# Patient Record
Sex: FEMALE | Race: WHITE
[De-identification: names, ages, dates, MRNs, and addresses within clinical notes are randomized per-mention and may not be internally consistent; named-entity substitution may affect disease eponyms.]

---

## 2020-05-22 ENCOUNTER — HOSPITAL ENCOUNTER (EMERGENCY)
Dept: HOSPITAL 62 - ER | Age: 30
Discharge: HOME | End: 2020-05-22
Payer: MEDICAID

## 2020-05-22 VITALS — SYSTOLIC BLOOD PRESSURE: 117 MMHG | DIASTOLIC BLOOD PRESSURE: 78 MMHG

## 2020-05-22 DIAGNOSIS — R53.81: ICD-10-CM

## 2020-05-22 DIAGNOSIS — R53.1: ICD-10-CM

## 2020-05-22 DIAGNOSIS — R11.2: ICD-10-CM

## 2020-05-22 DIAGNOSIS — F17.210: ICD-10-CM

## 2020-05-22 DIAGNOSIS — F32.9: ICD-10-CM

## 2020-05-22 DIAGNOSIS — F10.920: Primary | ICD-10-CM

## 2020-05-22 DIAGNOSIS — Z59.0: ICD-10-CM

## 2020-05-22 LAB
ADD MANUAL DIFF: NO
ALBUMIN SERPL-MCNC: 4.2 G/DL (ref 3.5–5)
ALP SERPL-CCNC: 102 U/L (ref 38–126)
ANION GAP SERPL CALC-SCNC: 9 MMOL/L (ref 5–19)
APAP SERPL-MCNC: < 10 UG/ML (ref 10–30)
APPEARANCE UR: CLEAR
APTT PPP: (no result) S
AST SERPL-CCNC: 34 U/L (ref 14–36)
BARBITURATES UR QL SCN: NEGATIVE
BASOPHILS # BLD AUTO: 0 10^3/UL (ref 0–0.2)
BASOPHILS NFR BLD AUTO: 0.5 % (ref 0–2)
BILIRUB DIRECT SERPL-MCNC: 0 MG/DL (ref 0–0.4)
BILIRUB SERPL-MCNC: 0.5 MG/DL (ref 0.2–1.3)
BILIRUB UR QL STRIP: NEGATIVE
BUN SERPL-MCNC: 12 MG/DL (ref 7–20)
CALCIUM: 8.5 MG/DL (ref 8.4–10.2)
CHLORIDE SERPL-SCNC: 103 MMOL/L (ref 98–107)
CK SERPL-CCNC: 80 U/L (ref 30–135)
CO2 SERPL-SCNC: 29 MMOL/L (ref 22–30)
EOSINOPHIL # BLD AUTO: 0.1 10^3/UL (ref 0–0.6)
EOSINOPHIL NFR BLD AUTO: 2 % (ref 0–6)
ERYTHROCYTE [DISTWIDTH] IN BLOOD BY AUTOMATED COUNT: 15 % (ref 11.5–14)
ETHANOL SERPL-MCNC: 451 MG/DL
GLUCOSE SERPL-MCNC: 118 MG/DL (ref 75–110)
GLUCOSE UR STRIP-MCNC: NEGATIVE MG/DL
HCT VFR BLD CALC: 41.9 % (ref 36–47)
HGB BLD-MCNC: 14 G/DL (ref 12–15.5)
KETONES UR STRIP-MCNC: NEGATIVE MG/DL
LYMPHOCYTES # BLD AUTO: 2.3 10^3/UL (ref 0.5–4.7)
LYMPHOCYTES NFR BLD AUTO: 41 % (ref 13–45)
MCH RBC QN AUTO: 29.6 PG (ref 27–33.4)
MCHC RBC AUTO-ENTMCNC: 33.5 G/DL (ref 32–36)
MCV RBC AUTO: 88 FL (ref 80–97)
METHADONE UR QL SCN: NEGATIVE
MONOCYTES # BLD AUTO: 0.3 10^3/UL (ref 0.1–1.4)
MONOCYTES NFR BLD AUTO: 5 % (ref 3–13)
NEUTROPHILS # BLD AUTO: 2.9 10^3/UL (ref 1.7–8.2)
NEUTS SEG NFR BLD AUTO: 51.5 % (ref 42–78)
NITRITE UR QL STRIP: NEGATIVE
PCP UR QL SCN: NEGATIVE
PH UR STRIP: 7 [PH] (ref 5–9)
PLATELET # BLD: 285 10^3/UL (ref 150–450)
POTASSIUM SERPL-SCNC: 4.6 MMOL/L (ref 3.6–5)
PROT SERPL-MCNC: 7.8 G/DL (ref 6.3–8.2)
PROT UR STRIP-MCNC: NEGATIVE MG/DL
RBC # BLD AUTO: 4.74 10^6/UL (ref 3.72–5.28)
SALICYLATES SERPL-MCNC: < 1 MG/DL (ref 2–20)
SP GR UR STRIP: 1
TOTAL CELLS COUNTED % (AUTO): 100 %
URINE AMPHETAMINES SCREEN: NEGATIVE
URINE BENZODIAZEPINES SCREEN: NEGATIVE
URINE COCAINE SCREEN: NEGATIVE
URINE MARIJUANA (THC) SCREEN: NEGATIVE
UROBILINOGEN UR-MCNC: NEGATIVE MG/DL (ref ?–2)
WBC # BLD AUTO: 5.7 10^3/UL (ref 4–10.5)

## 2020-05-22 PROCEDURE — 82550 ASSAY OF CK (CPK): CPT

## 2020-05-22 PROCEDURE — 81001 URINALYSIS AUTO W/SCOPE: CPT

## 2020-05-22 PROCEDURE — 80307 DRUG TEST PRSMV CHEM ANLYZR: CPT

## 2020-05-22 PROCEDURE — S0028 INJECTION, FAMOTIDINE, 20 MG: HCPCS

## 2020-05-22 PROCEDURE — 80053 COMPREHEN METABOLIC PANEL: CPT

## 2020-05-22 PROCEDURE — 36415 COLL VENOUS BLD VENIPUNCTURE: CPT

## 2020-05-22 PROCEDURE — 85025 COMPLETE CBC W/AUTO DIFF WBC: CPT

## 2020-05-22 PROCEDURE — 96375 TX/PRO/DX INJ NEW DRUG ADDON: CPT

## 2020-05-22 PROCEDURE — 99285 EMERGENCY DEPT VISIT HI MDM: CPT

## 2020-05-22 PROCEDURE — 96365 THER/PROPH/DIAG IV INF INIT: CPT

## 2020-05-22 PROCEDURE — 96361 HYDRATE IV INFUSION ADD-ON: CPT

## 2020-05-22 SDOH — ECONOMIC STABILITY - HOUSING INSECURITY: HOMELESSNESS: Z59.0

## 2020-05-22 NOTE — ER DOCUMENT REPORT
Doctor's Note


Notes: 





05/22/20 14:13


Patient is resting comfortably.  Her blood alcohol level was 451mg% at 01:10 

this morning.  She was sent here from [facility] but because her alcohol level needs to

be below 200 to return.  Alcohol level will be redrawn to confirm it is below 

200 as it would most expectedly be at this point.

## 2020-05-22 NOTE — ER DOCUMENT REPORT
ED General





- General


Chief Complaint: Vomiting


Stated Complaint: VOMITING


Time Seen by Provider: 20 00:47


Mode of Arrival: Ambulatory


Information source: Patient


Cannot obtain history due to: Intoxicated


Notes: 





29-year-old  female arrives by POV with chief complaint of being 

homeless and drinking a lot of alcohol which includes beer and "Four Nacho" which

is a wine like beverage sold by [employer] with alcohol content of @ 14% ; 

patient reports her boyfriend  in [facility] 3 days ago from heroin 

overdose.  Patient is originally from [place] and tried to check herself in at 

[facility].  Unfortunately her alcohol level was 0.43 and she was sent here in order to

obtain a more lower alcohol level.  Patient appears to be able to ambulate and 

is quite oriented despite her high alcohol level.  She does have some nausea at 

this time as she was wheelchair again by BERNARDO Dye.


TRAVEL OUTSIDE OF THE U[place] IN LAST 30 DAYS: No





- HPI


Onset: Just prior to arrival


Onset/Duration: Sudden, Persistent


Quality of pain: No pain


Severity: None


Pain Level: Denies


Associated symptoms: None


Exacerbated by: Walking


Relieved by: Sitting


Similar symptoms previously: Yes


Recently seen / treated by doctor: No





- Related Data


Allergies/Adverse Reactions: 


                                        





No Known Allergies Allergy (Unverified 20 00:54)


   











Past Medical History





- General


Information source: Patient





- Social History


Smoking Status: Current Every Day Smoker


Cigarette use (# per day): Yes


Chew tobacco use (# tins/day): No


Smoking Education Provided: Yes


Frequency of alcohol use: Heavy


Drug Abuse: Other


Lives with: Family


Family History: Reviewed & Not Pertinent


Patient has suicidal ideation: No


Patient has homicidal ideation: No





Review of Systems





- Review of Systems


Constitutional: See HPI, Malaise, Weakness


EENT: No symptoms reported


Cardiovascular: No symptoms reported


Respiratory: No symptoms reported


Gastrointestinal: No symptoms reported


Genitourinary: No symptoms reported


Female Genitourinary: No symptoms reported


Musculoskeletal: No symptoms reported


Skin: No symptoms reported


Hematologic/Lymphatic: No symptoms reported


Neurological/Psychological: See HPI, Weakness, Other - Intoxicated on alcohol





Physical Exam





- Vital signs


Vitals: 


                                        











Temp Pulse Resp BP Pulse Ox


 


 98.4 F   104 H  16   142/98 H  98 


 


 20 00:39  20 00:39  20 00:39  20 00:39  20 00:39











Interpretation: Hypertensive, Tachycardic





- General


General appearance: Alert





- HEENT


Head: Normocephalic, Atraumatic


Eyes: Normal


Pupils: PERRL





- Respiratory


Respiratory status: No respiratory distress


Chest status: Nontender


Breath sounds: Normal


Chest palpation: Normal





- Cardiovascular


Rhythm: Tachycardia


Heart sounds: Normal auscultation


Murmur: No





- Abdominal


Inspection: Normal


Distension: No distension


Bowel sounds: Normal


Tenderness: Nontender


Organomegaly: No organomegaly





- Back


Back: Normal





- Extremities


General upper extremity: Normal inspection


General lower extremity: Normal inspection





- Neurological


Neuro grossly intact: Yes


Cognition: Normal


Orientation: AAOx4


Kimberly Coma Scale Eye Opening: Spontaneous


Kimberly Coma Scale Verbal: Oriented


Kim[place]ly Coma Scale Motor: Obeys Commands


[place] Coma Scale Total: 15


Speech: Normal


Motor strength normal: LUE, RUE, LLE, RLE


Sensory: Normal





- Psychological


Associated symptoms: Tearful





- Skin


Skin Temperature: Warm


Skin Moisture: Dry





Course





- Vital Signs


Vital signs: 


                                        











Temp Pulse Resp BP Pulse Ox


 


 98.0 F   93   22 H  107/74   98 


 


 20 00:55  20 01:10  20 03:01  20 04:00  20 04:01














- Laboratory


Result Diagrams: 


                                 20 01:10





                                 20 01:10


Laboratory results interpreted by me: 


                                        











  20





  01:10 01:10 01:10


 


RDW  15.0 H  


 


Glucose   118 H 


 


Ur Leukocyte Esterase    SMALL H


 


Salicylates   < 1.0 L 


 


Acetaminophen   < 10 L 


 


Serum Alcohol   451 H* 














Critical Care Note





- Critical Care Note


Total time excluding time spent on procedures (mins): 90


Comments: 





Plan to discharge to [facility] as soon as alcohol level has dropped beneath 200 mg/dL 

alcohol level





Discharge





- Discharge


Clinical Impression: 


 Depression, reactive





Alcohol intoxication


Qualifiers:


 Complication of substance-induced condition: uncomplicated Qualified Code(s): 

F10.920 - Alcohol use, unspecified with intoxication, uncomplicated





Condition: Good


Disposition: OTHER


Additional Instructions: 


As soon as you leave here proceed to [facility]; return to ER if symptoms persist

or other severe problems arise.
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
## 2020-05-28 ENCOUNTER — HOSPITAL ENCOUNTER (EMERGENCY)
Dept: HOSPITAL 62 - ER | Age: 30
Discharge: HOME | End: 2020-05-28
Payer: COMMERCIAL

## 2020-05-28 VITALS — DIASTOLIC BLOOD PRESSURE: 90 MMHG | SYSTOLIC BLOOD PRESSURE: 138 MMHG

## 2020-05-28 VITALS — SYSTOLIC BLOOD PRESSURE: 146 MMHG | DIASTOLIC BLOOD PRESSURE: 89 MMHG

## 2020-05-28 DIAGNOSIS — H60.92: ICD-10-CM

## 2020-05-28 DIAGNOSIS — H92.02: Primary | ICD-10-CM

## 2020-05-28 DIAGNOSIS — M54.9: ICD-10-CM

## 2020-05-28 DIAGNOSIS — R10.9: ICD-10-CM

## 2020-05-28 DIAGNOSIS — F12.10: ICD-10-CM

## 2020-05-28 DIAGNOSIS — H60.502: Primary | ICD-10-CM

## 2020-05-28 DIAGNOSIS — Z20.828: ICD-10-CM

## 2020-05-28 DIAGNOSIS — R13.10: ICD-10-CM

## 2020-05-28 DIAGNOSIS — R07.0: ICD-10-CM

## 2020-05-28 DIAGNOSIS — F17.200: ICD-10-CM

## 2020-05-28 DIAGNOSIS — R11.10: ICD-10-CM

## 2020-05-28 DIAGNOSIS — H92.02: ICD-10-CM

## 2020-05-28 DIAGNOSIS — H66.92: ICD-10-CM

## 2020-05-28 DIAGNOSIS — N39.0: ICD-10-CM

## 2020-05-28 LAB
APPEARANCE UR: (no result)
APTT PPP: (no result) S
BILIRUB UR QL STRIP: (no result)
GLUCOSE UR STRIP-MCNC: NEGATIVE MG/DL
KETONES UR STRIP-MCNC: 20 MG/DL
NITRITE UR QL STRIP: NEGATIVE
PH UR STRIP: 6 [PH] (ref 5–9)
PROT UR STRIP-MCNC: 100 MG/DL
SP GR UR STRIP: 1.03
UROBILINOGEN UR-MCNC: 4 MG/DL (ref ?–2)

## 2020-05-28 PROCEDURE — 87635 SARS-COV-2 COVID-19 AMP PRB: CPT

## 2020-05-28 PROCEDURE — 87086 URINE CULTURE/COLONY COUNT: CPT

## 2020-05-28 PROCEDURE — 99282 EMERGENCY DEPT VISIT SF MDM: CPT

## 2020-05-28 PROCEDURE — 99283 EMERGENCY DEPT VISIT LOW MDM: CPT

## 2020-05-28 PROCEDURE — 81001 URINALYSIS AUTO W/SCOPE: CPT

## 2020-05-28 PROCEDURE — C9803 HOPD COVID-19 SPEC COLLECT: HCPCS

## 2020-05-28 PROCEDURE — 96365 THER/PROPH/DIAG IV INF INIT: CPT

## 2020-05-28 PROCEDURE — 81025 URINE PREGNANCY TEST: CPT

## 2020-05-28 RX ADMIN — HYDROCODONE BITARTRATE AND ACETAMINOPHEN PRN BOTTLE: 5; 325 TABLET ORAL at 23:59

## 2020-05-28 RX ADMIN — HYDROCODONE BITARTRATE AND ACETAMINOPHEN PRN BOTTLE: 5; 325 TABLET ORAL at 22:56
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
## 2020-05-28 NOTE — ER DOCUMENT REPORT
ED General





- General


Chief Complaint: Vomiting


Stated Complaint: VOMITING/EAR PAIN/BACK PAIN


Time Seen by Provider: 05/28/20 21:48


Notes: 


Patient is a 29-year-old female that comes emergency department for chief 

complaint of 3 days of progressively worsening left ear pain.  She also reports 

some throat irritation and pain on the left side when she swallows.  Patient 

states she was seen here earlier, given eardrops, she states these are not h

elping and her pain is getting worse.  She also states that she thinks she might

be pregnant and this was not tested when she was seen her earlier as well.  She 

denies fever, shortness of breath, chest pain, headache, abdominal pain, vaginal

bleeding or discharge.  She denies any daily medications.  She denies IV drug 

abuse, states she smokes occasional marijuana.  She does report smoking and alco

hol.  Patient also noted to be homeless here previously.


TRAVEL OUTSIDE OF THE [place] IN LAST 30 DAYS: No





- Related Data


Allergies/Adverse Reactions: 


                                        





No Known Allergies Allergy (Verified 05/28/20 21:40)


   











Past Medical History





- General


Information source: Patient





- Social History


Smoking Status: Current Every Day Smoker


Frequency of alcohol use: Social


Drug Abuse: Marijuana


Lives with: Alone


Family History: Reviewed & Not Pertinent





- Medical History


Medical History: Negative


Surgical Hx: Negative





- Immunizations


Immunizations up to date: Yes


Hx Diphtheria, Pertussis, Tetanus Vaccination: Yes





Review of Systems





- Review of Systems


Constitutional: No symptoms reported


EENT: See HPI


Cardiovascular: No symptoms reported


Respiratory: No symptoms reported


Gastrointestinal: No symptoms reported


Genitourinary: No symptoms reported


Female Genitourinary: See HPI


Musculoskeletal: No symptoms reported


Skin: No symptoms reported


Hematologic/Lymphatic: No symptoms reported


Neurological/Psychological: No symptoms reported





Physical Exam





- Vital signs


Vitals: 


                                        











Temp Resp BP


 


 98.3 F   18   146/89 H


 


 05/28/20 21:18  05/28/20 21:18  05/28/20 21:18














- Notes


Notes: 





GENERAL: Alert, interacts well. No acute distress.


HEAD: Normocephalic, atraumatic.


EYES: Pupils equal, round, and reactive to light. Extraocular movements intact.


ENT: Oral mucosa moist, tongue midline. Oropharynx unremarkable. Airway patent. 

Nares patent, sinuses non-tender. right ear unremarkable and nontender.  Left 

ear with obvious otitis externa with swelling of the ear canal and small amount 

of drainage.  There also appears to be some erythema of the tympanic membrane.  

Tenderness over the tragus but normal mastoid.  Otherwise unremarkable.


NECK: Full range of motion. Supple. Trachea midline. No lymphadenopathy.  No 

nuchal rigidity.


LUNGS: Clear to auscultation bilaterally, no wheezes, rales, or rhonchi. No 

respiratory distress. Non-tender chest wall. 


HEART: Borderline tachycardia, normal rhythm, no murmur


ABDOMEN: Soft, non-tender. Non-distended. Bowel sounds present in all 4 

quadrants.


GENITOURINARY: Deferred


EXTREMITIES: Moves all 4 extremities spontaneously. No edema, normal radial and 

dorsalis pedis pulses bilaterally. No cyanosis.


BACK: no cervical, thoracic, lumbar midline tenderness. No saddle anesthesia, 

normal distal neurovascular exam. Moves all extremities in full range of motion.


NEUROLOGICAL: Alert and oriented x3. Normal speech. Cranial nerves II through 

XII grossly intact. Strength 5/5 in all extremities. 


PSYCH: Normal affect, normal mood.


SKIN: Warm, dry, normal turgor. No rashes or lesions noted.





Course





- Re-evaluation


Re-evalutation: 


Patient does have obvious otitis externa with swelling of the ear canal and 

probable borderline otitis media as well.  Mastoid is normal.  Patient is 

afebrile and she is actually quite well-appearing other than dry mucous 

membranes and tachycardia.  This resolved with IV fluids.  Patient has a urinary

tract infection but her pregnancy test is negative.  No CVA tenderness on my 

exam, soft benign abdomen, no vomiting, no fever.  Patient was given a dose of 

Rocephin along with the IV fluids here.  Despite homelessness patient does state

that she will fill and take the antibiotics prescribed and she was provided with

Ciprodex from here.  Patient has no other complaints, states appreciation.  

Stable and well-appearing at time of discharge.





- Vital Signs


Vital signs: 


                                        











Temp Pulse Resp BP Pulse Ox


 


 98.3 F   100   18   146/89 H  100 


 


 05/28/20 21:40  05/28/20 22:00  05/28/20 22:00  05/28/20 21:18  05/28/20 22:00














- Laboratory


Laboratory results interpreted by me: 


                                        











  05/28/20





  21:35


 


Urine Protein  100 H


 


Urine Ketones  20 H


 


Urine Blood  SMALL H


 


Urine Bilirubin  SMALL H


 


Urine Urobilinogen  4.0 H


 


Ur Leukocyte Esterase  SMALL H














Discharge





- Discharge


Clinical Impression: 


 Flank pain





Otitis externa


Qualifiers:


 Otitis externa type: unspecified type Chronicity: acute Laterality: left 

Qualified Code(s): H60.502 - Unspecified acute noninfective otitis externa, left

ear





Urinary tract infection


Qualifiers:


 Urinary tract infection type: site unspecified Hematuria presence: without 

hematuria Qualified Code(s): N39.0 - Urinary tract infection, site not specified





Condition: Stable


Disposition: HOME, SELF-CARE


Additional Instructions: 


Your work-up shows dehydration, urinary tract infection, and the ear infection 

as discussed.  Take the antibiotics as prescribed to completion, fill and take 

the ones prescribed now instead of the previous ones.  Also place the eardrops 

in your ear, 4 drops, twice a day, for 7 days.  Follow-up with primary care.





Come back if you worsen including vomiting, spiking fever, swelling or redness 

at the outside of the ear, or any other concerning symptoms.


Prescriptions: 


Cephalexin Monohydrate [Keflex 500 mg Capsule] 500 mg PO QID #28 capsule

## 2020-05-29 ENCOUNTER — HOSPITAL ENCOUNTER (EMERGENCY)
Dept: HOSPITAL 62 - ER | Age: 30
Discharge: HOME | End: 2020-05-29
Payer: COMMERCIAL

## 2020-05-29 VITALS — DIASTOLIC BLOOD PRESSURE: 89 MMHG | SYSTOLIC BLOOD PRESSURE: 138 MMHG

## 2020-05-29 DIAGNOSIS — R74.8: ICD-10-CM

## 2020-05-29 DIAGNOSIS — Z59.0: ICD-10-CM

## 2020-05-29 DIAGNOSIS — Z20.828: ICD-10-CM

## 2020-05-29 DIAGNOSIS — R19.7: ICD-10-CM

## 2020-05-29 DIAGNOSIS — R11.2: Primary | ICD-10-CM

## 2020-05-29 DIAGNOSIS — H66.92: ICD-10-CM

## 2020-05-29 DIAGNOSIS — H60.392: ICD-10-CM

## 2020-05-29 DIAGNOSIS — F17.200: ICD-10-CM

## 2020-05-29 DIAGNOSIS — R42: ICD-10-CM

## 2020-05-29 DIAGNOSIS — R79.89: ICD-10-CM

## 2020-05-29 LAB
ADD MANUAL DIFF: NO
ALBUMIN SERPL-MCNC: 4.1 G/DL (ref 3.5–5)
ALP SERPL-CCNC: 206 U/L (ref 38–126)
ANION GAP SERPL CALC-SCNC: 11 MMOL/L (ref 5–19)
APPEARANCE UR: (no result)
APTT PPP: (no result) S
AST SERPL-CCNC: 50 U/L (ref 14–36)
BASOPHILS # BLD AUTO: 0 10^3/UL (ref 0–0.2)
BASOPHILS NFR BLD AUTO: 0.1 % (ref 0–2)
BILIRUB DIRECT SERPL-MCNC: 0.2 MG/DL (ref 0–0.4)
BILIRUB SERPL-MCNC: 1.8 MG/DL (ref 0.2–1.3)
BILIRUB UR QL STRIP: NEGATIVE
BUN SERPL-MCNC: 10 MG/DL (ref 7–20)
CALCIUM: 9.1 MG/DL (ref 8.4–10.2)
CHLORIDE SERPL-SCNC: 99 MMOL/L (ref 98–107)
CO2 SERPL-SCNC: 25 MMOL/L (ref 22–30)
EOSINOPHIL # BLD AUTO: 0.1 10^3/UL (ref 0–0.6)
EOSINOPHIL NFR BLD AUTO: 0.7 % (ref 0–6)
ERYTHROCYTE [DISTWIDTH] IN BLOOD BY AUTOMATED COUNT: 14.8 % (ref 11.5–14)
GLUCOSE SERPL-MCNC: 101 MG/DL (ref 75–110)
GLUCOSE UR STRIP-MCNC: NEGATIVE MG/DL
HCT VFR BLD CALC: 38.3 % (ref 36–47)
HGB BLD-MCNC: 13.2 G/DL (ref 12–15.5)
KETONES UR STRIP-MCNC: 20 MG/DL
LYMPHOCYTES # BLD AUTO: 0.6 10^3/UL (ref 0.5–4.7)
LYMPHOCYTES NFR BLD AUTO: 8.4 % (ref 13–45)
MCH RBC QN AUTO: 30.6 PG (ref 27–33.4)
MCHC RBC AUTO-ENTMCNC: 34.3 G/DL (ref 32–36)
MCV RBC AUTO: 89 FL (ref 80–97)
MONOCYTES # BLD AUTO: 0.3 10^3/UL (ref 0.1–1.4)
MONOCYTES NFR BLD AUTO: 3.7 % (ref 3–13)
NEUTROPHILS # BLD AUTO: 6.4 10^3/UL (ref 1.7–8.2)
NEUTS SEG NFR BLD AUTO: 87.1 % (ref 42–78)
NITRITE UR QL STRIP: NEGATIVE
PH UR STRIP: 6 [PH] (ref 5–9)
PLATELET # BLD: 146 10^3/UL (ref 150–450)
POTASSIUM SERPL-SCNC: 4.2 MMOL/L (ref 3.6–5)
PROT SERPL-MCNC: 8 G/DL (ref 6.3–8.2)
PROT UR STRIP-MCNC: 100 MG/DL
RBC # BLD AUTO: 4.31 10^6/UL (ref 3.72–5.28)
SP GR UR STRIP: 1.02
TOTAL CELLS COUNTED % (AUTO): 100 %
UROBILINOGEN UR-MCNC: 4 MG/DL (ref ?–2)
WBC # BLD AUTO: 7.4 10^3/UL (ref 4–10.5)

## 2020-05-29 PROCEDURE — 85025 COMPLETE CBC W/AUTO DIFF WBC: CPT

## 2020-05-29 PROCEDURE — 99284 EMERGENCY DEPT VISIT MOD MDM: CPT

## 2020-05-29 PROCEDURE — 83690 ASSAY OF LIPASE: CPT

## 2020-05-29 PROCEDURE — 96374 THER/PROPH/DIAG INJ IV PUSH: CPT

## 2020-05-29 PROCEDURE — 84703 CHORIONIC GONADOTROPIN ASSAY: CPT

## 2020-05-29 PROCEDURE — 36415 COLL VENOUS BLD VENIPUNCTURE: CPT

## 2020-05-29 PROCEDURE — 81001 URINALYSIS AUTO W/SCOPE: CPT

## 2020-05-29 PROCEDURE — 80053 COMPREHEN METABOLIC PANEL: CPT

## 2020-05-29 PROCEDURE — 76705 ECHO EXAM OF ABDOMEN: CPT

## 2020-05-29 PROCEDURE — 96361 HYDRATE IV INFUSION ADD-ON: CPT

## 2020-05-29 SDOH — ECONOMIC STABILITY - HOUSING INSECURITY: HOMELESSNESS: Z59.0

## 2020-05-29 NOTE — RADIOLOGY REPORT (SQ)
EXAM DESCRIPTION:  U/S ABDOMEN LIMITED W/O DOP



IMAGES COMPLETED DATE/TIME:  5/29/2020 2:15 pm



REASON FOR STUDY:  n/v/d, elev LFT, bili



COMPARISON:  None.



TECHNIQUE:  Dynamic and static grayscale images acquired of the abdomen and recorded on PACS. Additio
nal selected color Doppler and spectral images recorded.



LIMITATIONS:  None.



FINDINGS:  PANCREAS: No masses.  Visualized pancreatic duct normal caliber.

LIVER: No masses. Echotexture normal.

LIVER VASCULATURE: Normal directional flow of the main portal vein and hepatic veins.

GALLBLADDER: No stones. Normal wall thickness. No pericholecystic fluid.

ULTRASOUND-DETECTED CARRASCO'S SIGN: Negative.

INTRAHEPATIC DUCTS AND COMMON DUCT: CBD and intrahepatic ducts normal caliber. No filling defects.

AORTA: No aneurysm.

RIGHT KIDNEY:  Normal size, 10.5 cm. Normal echogenicity. No solid or suspicious masses. No hydroneph
rosis. No calcifications.

PERITONEAL AND RIGHT PLEURAL SPACE: No ascites or effusions.

OTHER: No other significant findings.



IMPRESSION:  NORMAL RIGHT UPPER QUADRANT ULTRASOUND.



TECHNICAL DOCUMENTATION:  JOB ID:  [id number]

 2011 [employer]- All Rights Reserved



Reading location - IP/workstation name: [email or web address]

## 2020-05-29 NOTE — ER DOCUMENT REPORT
ED GI/





- General


Chief Complaint: Nausea/Vomiting/Diarrhea


Stated Complaint: NAUSEA,VOMITING,DIARRHEA,CHILLS


Time Seen by Provider: 05/29/20 12:04


Primary Care Provider: 


[facility] [Provider Group] - Follow up as needed


[facility] [Provider Group] - Follow up as needed


Mode of Arrival: Ambulatory


Information source: Patient


Notes: 





She presents complaint of nausea vomiting diarrhea for the past 3 days.  Patient

reports vomiting 4 times today and having diarrhea x1.  Patient denies any 

fever.  Patient does complain of dizziness.  Patient is currently being treated 

for a left-sided ear infection.


TRAVEL OUTSIDE OF THE [place] IN LAST 30 DAYS: No





- HPI


Patient complains to provider of: Diarrhea, Vomiting.  No: Abdominal pain


Onset: Other - 3 days


Timing/Duration: Persistent


Quality of pain: No pain


Pain Level: Denies


Associated symptoms: Diarrhea, Dizzy, Nausea, Vomiting.  denies: Loss of 

appetite, Urinary hesitancy, Urinary frequency


Exacerbated by: Denies


Relieved by: Denies


Similar symptoms previously: No


Recently seen / treated by doctor: Yes





- Related Data


Allergies/Adverse Reactions: 


                                        





No Known Allergies Allergy (Verified 05/28/20 21:40)


   











Past Medical History





- General


Information source: Patient





- Social History


Smoking Status: Current Some Day Smoker


Chew tobacco use (# tins/day): No


Frequency of alcohol use: Heavy


Drug Abuse: None


Lives with: Homeless


Family History: Reviewed & Not Pertinent


Patient has homicidal ideation: No


Psychiatric Medical History: Reports: Hx Anxiety, Hx Depression


Surgical Hx: Negative





- Immunizations


Immunizations up to date: Yes


Hx Diphtheria, Pertussis, Tetanus Vaccination: Yes





Review of Systems





- Review of Systems


Constitutional: Recent illness - Ear infection


EENT: Ear pain, Ear discharge


Cardiovascular: Dizziness.  denies: Chest pain


Respiratory: No symptoms reported.  denies: Cough, Short of breath


Gastrointestinal: Diarrhea, Nausea, Vomiting.  denies: Abdominal pain, Black 

stools, Rectal bleeding


Genitourinary: No symptoms reported


Female Genitourinary: No symptoms reported


Musculoskeletal: No symptoms reported


Skin: No symptoms reported


Hematologic/Lymphatic: No symptoms reported


Neurological/Psychological: No symptoms reported





Physical Exam





- Vital signs


Vitals: 


                                        











Temp


 


 98.4 F 


 


 05/29/20 11:10














- General


General appearance: Appears well, Alert


In distress: None





- HEENT


Head: Normocephalic, Atraumatic


Eyes: Normal


Ears: Normal


External canal: Other - Otorrhea to left ear canal


Tympanic membrane: Other - Normal right TM, left unable to be visualized


Nasal: Normal


Mouth/Lips: Normal


Mucous membranes: Normal


Neck: Normal, Supple.  No: Lymphadenopathy





- Respiratory


Respiratory status: No respiratory distress


Chest status: Nontender


Breath sounds: Normal.  No: Rales, Rhonchi, Stridor, Wheezing


Chest palpation: Normal





- Cardiovascular


Rhythm: Regular


Heart sounds: S1 appreciated, S2 appreciated


Murmur: No





- Abdominal


Inspection: Normal


Distension: No distension


Bowel sounds: Normal


Tenderness: Nontender


Organomegaly: No organomegaly





- Back


Back: Normal.  No: CVA tenderness





- Extremities


General upper extremity: Normal inspection, Normal ROM


General lower extremity: Normal inspection, Normal ROM





- Neurological


Neuro grossly intact: Yes


Cognition: Normal


[place] Coma Scale Eye Opening: Spontaneous


[place] Coma Scale Verbal: Oriented


[place] Coma Scale Motor: Obeys Commands


[place] Coma Scale Total: 15





- Psychological


Associated symptoms: Normal affect, Normal mood





- Skin


Skin Temperature: Warm


Skin Moisture: Dry


Skin Color: Normal





Course





- Re-evaluation


Re-evalutation: 





05/29/20 12:30


Patient reports that nausea is improved at this time.  Patient is requesting 

p.o. fluids.  Patient with elevated bilirubin and alkaline phosphatase, will add

ultrasound imaging at this time.  Abdomen soft nontender





05/29/20 15:08


Patient was seen here yesterday and treated for otitis media and otitis externa 

and urinary tract infection.  Urine does appear to be improved as compared to 

yesterday's urinalysis.  Patient encouraged to continue to take her antibiotics 

as previously prescribed.  Patient's abdomen soft without guarding.  Patient 

without any emesis while here in the department.  Patient does complain of 

dizziness although suspect this is due to her left sided ear infection at this 

time.  We will screen for the coronavirus at this time given her GI symptoms.  

Good return precautions discussed with patient.





Patient with her fourth ER visit in the past week.  Staff report the patient is 

homeless and appears to be malingering





- Vital Signs


Vital signs: 


                                        











Temp Pulse Resp BP Pulse Ox


 


 97.7 F   84   16   138/89 H  100 


 


 05/29/20 16:45  05/29/20 16:45  05/29/20 16:45  05/29/20 16:45  05/29/20 16:45














- Laboratory


Result Diagrams: 


                                 05/29/20 11:30





                                 05/29/20 11:30


Laboratory results interpreted by me: 


                                        











  05/29/20 05/29/20 05/29/20





  11:30 11:30 12:30


 


RDW  14.8 H  


 


Plt Count  146 L  


 


Lymph % (Auto)  8.4 L  


 


Seg Neutrophils %  87.1 H  


 


Sodium   135.0 L 


 


Creatinine   0.45 L 


 


Total Bilirubin   1.8 H 


 


AST   50 H 


 


Alkaline Phosphatase   206 H 


 


Urine Protein    100 H


 


Urine Ketones    20 H


 


Urine Urobilinogen    4.0 H


 


Ur Leukocyte Esterase    SMALL H











05/29/20 15:03





                              Labs- All tests 24 hr











  05/29/20 05/29/20 05/29/20





  11:30 11:30 11:30


 


WBC  7.4  


 


RBC  4.31  


 


Hgb  13.2  


 


Hct  38.3  


 


MCV  89  


 


MCH  30.6  


 


MCHC  34.3  


 


RDW  14.8 H  


 


Plt Count  146 L  


 


Lymph % (Auto)  8.4 L  


 


Mono % (Auto)  3.7  


 


Eos % (Auto)  0.7  


 


Baso % (Auto)  0.1  


 


Absolute Neuts (auto)  6.4  


 


Absolute Lymphs (auto)  0.6  


 


Absolute Monos (auto)  0.3  


 


Absolute Eos (auto)  0.1  


 


Absolute Basos (auto)  0.0  


 


Seg Neutrophils %  87.1 H  


 


Sodium   135.0 L 


 


Potassium   4.2 


 


Chloride   99 


 


Carbon Dioxide   25 


 


Anion Gap   11 


 


BUN   10 


 


Creatinine   0.45 L 


 


Est GFR ( Amer)   > 60 


 


Est GFR (MDRD) Non-Af   > 60 


 


Glucose   101 


 


Calcium   9.1 


 


Total Bilirubin   1.8 H 


 


Direct Bilirubin   0.2 


 


Neonat Total Bilirubin   Not Reportable 


 


Neonat Direct Bilirubin   Not Reportable 


 


Neonat Indirect Bili   Not Reportable 


 


AST   50 H 


 


ALT   33 


 


Alkaline Phosphatase   206 H 


 


Total Protein   8.0 


 


Albumin   4.1 


 


Lipase    90.1


 


Serum HCG, Qual   


 


Urine Color   


 


Urine Appearance   


 


Urine pH   


 


Ur Specific Gravity   


 


Urine Protein   


 


Urine Glucose (UA)   


 


Urine Ketones   


 


Urine Blood   


 


Urine Nitrite   


 


Urine Bilirubin   


 


Urine Urobilinogen   


 


Ur Leukocyte Esterase   


 


Urine WBC (Auto)   


 


Urine RBC (Auto)   


 


Squamous Epi Cells Auto   


 


Urine Mucus (Auto)   


 


Urine Ascorbic Acid   














  05/29/20 05/29/20





  11:30 12:30


 


WBC  


 


RBC  


 


Hgb  


 


Hct  


 


MCV  


 


MCH  


 


MCHC  


 


RDW  


 


Plt Count  


 


Lymph % (Auto)  


 


Mono % (Auto)  


 


Eos % (Auto)  


 


Baso % (Auto)  


 


Absolute Neuts (auto)  


 


Absolute Lymphs (auto)  


 


Absolute Monos (auto)  


 


Absolute Eos (auto)  


 


Absolute Basos (auto)  


 


Seg Neutrophils %  


 


Sodium  


 


Potassium  


 


Chloride  


 


Carbon Dioxide  


 


Anion Gap  


 


BUN  


 


Creatinine  


 


Est GFR ( Amer)  


 


Est GFR (MDRD) Non-Af  


 


Glucose  


 


Calcium  


 


Total Bilirubin  


 


Direct Bilirubin  


 


Neonat Total Bilirubin  


 


Neonat Direct Bilirubin  


 


Neonat Indirect Bili  


 


AST  


 


ALT  


 


Alkaline Phosphatase  


 


Total Protein  


 


Albumin  


 


Lipase  


 


Serum HCG, Qual  NEGATIVE 


 


Urine Color   ÁNGEL


 


Urine Appearance   SLIGHTLY-CLOUDY


 


Urine pH   6.0


 


Ur Specific Gravity   1.021


 


Urine Protein   100 H


 


Urine Glucose (UA)   NEGATIVE


 


Urine Ketones   20 H


 


Urine Blood   NEGATIVE


 


Urine Nitrite   NEGATIVE


 


Urine Bilirubin   NEGATIVE


 


Urine Urobilinogen   4.0 H


 


Ur Leukocyte Esterase   SMALL H


 


Urine WBC (Auto)   15


 


Urine RBC (Auto)   7


 


Squamous Epi Cells Auto   6


 


Urine Mucus (Auto)   MANY


 


Urine Ascorbic Acid   NEGATIVE














- Diagnostic Test


Radiology reviewed: Reports reviewed





Discharge





- Discharge


Clinical Impression: 


 Nausea vomiting and diarrhea, Encounter for screening laboratory testing for 

COVID-19 virus





Condition: Stable


Disposition: HOME, SELF-CARE


Instructions:  COVID-19 Guidance for Persons Under Investigation


Additional Instructions: 


Return immediately for any new or worsening symptoms





Followup with your primary care provider, call tomorrow to make a followup 

appointment





Continue to take your antibiotics that you were previously prescribed for your 

infection and your ear infection.





VOMITING:





     Vomiting (or nausea without vomiting) can be caused by many other different

problems. It can mean that something's wrong with the stomach, such as ulcers or

inflammation or the intestinal tract, such as appendicitis.  But it can also be 

a symptom of a problem that has nothing to do with the stomach or intestines. 

Vomiting is common with severe headaches, earaches, tonsillitis, and kidney 

infections, etc. We see it with pneumonia or heart attacks. Drugs can cause 

nausea and vomiting. Many abdominal problems cause vomiting; for example, 

gallstones, kidney stones, pancreatitis, and intestinal obstruction (blocked 

bowels).


     In most cases, curing the vomiting depends on fixing the problem that 

caused it. For temporary relief, we may use an anti-nausea medicine. For home 

use, we can prescribe suppositories, chewable pills, pills that dissolve in the 

mouth, or liquid anti-nausea drugs. If the vomiting seems to be caused by a 

problem in the stomach, acid-suppressing drugs may be prescribed as well.


     It's important to avoid dehydration. Sip small amounts of clear liquids 

(soft drinks, tea, broth, etc) . Try to take fluids frequently even if you are v

omiting to prevent dehydration.  Take increasing amounts of fluid and when 

liquids are being consumed successfully, advance to small amounts of bland food 

(toast, soups, mashed potatoes, etc.) until you are able to resume a regular 

diet.  Avoid aspirin, tobacco, and alcohol.


     If the vomiting worsens, if the problem that's making you vomit worsens, or

if there's evidence of bleeding in the stomach (such as black, tarry stool, or 

bloody or black vomit), you should return immediately. Also, return if abdominal

pain worsens or becomes localized to one area or you develop high fever.  Call 

your doctor if you aren't improved in 24 hours.








DIARRHEA, NON-SPECIFIC:





     Diarrhea means frequent, watery stools. There are many causes. Any problem 

that keeps the intestinal tract from absorbing water from the stool can lead to 

diarrhea. 


     A sudden new diarrhea problem is usually caused by a virus, food 

sensitivity, toxic bacteria, or drugs. In this case, we expect the problem to go

away soon. Testing is done only if you seem seriously ill from the diarrhea.


     If you have chronic diarrhea, or diarrhea that keeps coming back, we need 

to find out why. Chronic diarrhea can be due to inflammation of the bowels such 

as Crohn's disease or ulcerative colitis, food sensitivity such as intolerance 

to lactose or wheat protein, irritable bowel syndrome, and other problems. If 

your diarrhea is a significant problem but it's not clear why you have it, we'll

refer you to a specialist for further testing.


     During an episode of diarrhea, drink small amounts (two to six ounces) of 

clear liquids (soft drinks, sport drinks, herb teas, broth, etc).  Take fluids 

frequently to prevent dehydration. It's usually not a problem to take mild anti-

diarrhea medication such as Kaopectate or Pepto-Bismol. As the diarrhea eases, 

advance to small amounts of bland food (mashed potato, toast) for 24 hours.


     Call the physician if blood appears in your vomit or stool, if vomiting 

lasts longer than 24 hours, if the abdominal pain worsens or becomes localized 

to one area, if you develop high fever, or if you become lightheaded and weak.








INTRAVENOUS (I V) FLUIDS:


     As part of your care today, you received intravenous (IV) fluids.  IV 

fluids are administered to patients who are dehydrated or to those who have 

certain chemical (electrolyte) abnormalities that need correcting.








[place] MEDICATION:


     You have been given a medication to suppress nausea and vomiting. This type

of medication can be given as a shot, pill, or suppository. It will usually last

for many hours.  Pills and shots usually last six to eight hours.  For the 

typical illness, only one or two doses of the medication may be necessary.


     Mild lightheadedness may occur.  This type of medicine can cause drows

iness.  Do not drive or operate dangerous machinery while under its influence.  

Do not mix with alcohol.


     See your doctor at once if you have muscle spasms or tightness, or 

uncontrollable motions (particularly of the neck, mouth, or jaw). Persistent 

vomiting or severe lightheadedness should also be evaluated by the physician.











FOLLOW-UP CARE:


If you have been referred to a physician for follow-up care, call the 

physicians office for an appointment as you were instructed or within the next 

two days.  If you experience worsening or a significant change in your symptoms,

notify the physician immediately or return to the Emergency Department at any 

time for re-evaluation.


Prescriptions: 


Meclizine HCl [Antivert 25 mg Tablet] 25 mg PO ASDIR PRN #20 tablet


 PRN Reason: 


Ondansetron [Zofran Odt 4 mg Tablet] 1 tab PO Q6H #15 tab.rapdis


Referrals: 


[facility] [Provider Group] - Follow up as needed


[facility] [Provider Group] - Follow up as needed